# Patient Record
Sex: MALE | Race: WHITE | NOT HISPANIC OR LATINO | URBAN - METROPOLITAN AREA
[De-identification: names, ages, dates, MRNs, and addresses within clinical notes are randomized per-mention and may not be internally consistent; named-entity substitution may affect disease eponyms.]

---
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
## 2024-01-10 NOTE — ED PEDIATRIC NURSE NOTE - NS CRAFFT PART A2 ALCOHOL
-- CST Phone ([id number] [facility]) Rosa Vick (Self) [phone] (M)    Left Message     01/10/2024 01:25 PM CST Phone (Incoming) Rosa Vick (Self) [phone] (M)          Alternative phone number: no    Can a detailed message be left?  Yes    Message Turnaround: No

## 2024-01-17 NOTE — ED BEHAVIORAL HEALTH ASSESSMENT NOTE - PATIENT'S CHIEF COMPLAINT
Suspect secondary to hypoxia  Cardiology input appreciated   "I told school I wanted to kill myself." - per patient

## 2024-02-12 NOTE — ED BEHAVIORAL HEALTH ASSESSMENT NOTE - NS ED BHA MED ROS RESPIRATORY
Pt is a poor historian ,also confused, unable to give information about SHx and PLOF. Available information obtained from chart review. No complaints

## 2025-03-20 NOTE — ED PROVIDER NOTE - DISCHARGE DATE
Called pt.  Made aware of PSA result and MD's message.  MRI orders placed.  Pt will schedule his MRI and call once scheduled to arrange his appt with Dr Chaney.      ----- Message from Valentin Chaney MD sent at 3/17/2025  5:21 PM CDT -----  Thalia,  Please see if you can reach him.  Should see me in the near future but I would like to repeat his prostate MRI first.  That may push out the appt a couple months    Thanks    PSA, Total (ng/mL)       Date                     Value                 09/13/2019               2.84             ----------  Prostate Specific Antigen (ng/mL)       Date                     Value                 03/17/2025               9.48 (H)              09/28/2024               9.50 (H)              08/01/2024               8.84 (H)              10/09/2023               8.94 (H)              09/26/2023               8.79 (H)              11/20/2020               6.28 (H)         ----------       Patient Active Problem List:     Other and unspecified hyperlipidemia     Asthma (CMD)     Coronary atherosclerosis of unspecified type of vessel, native or graft     Iron deficiency anemia due to chronic blood loss     Helicobacter pylori gastritis     Situational depression     Hiatal hernia with GERD     s/p Robotic Hiatal Hernia Repair with Dick Fundoplication      Elevated PSA     Primary hypertension     Hernia of abdominal cavity     Incarcerated ventral hernia  ----- Message -----  From: Lab, Background User  Sent: 3/17/2025   2:05 PM CDT  To: Valentin Prieto Urol Result Pool   12-Oct-2020